# Patient Record
Sex: MALE | Race: WHITE | Employment: FULL TIME | URBAN - METROPOLITAN AREA
[De-identification: names, ages, dates, MRNs, and addresses within clinical notes are randomized per-mention and may not be internally consistent; named-entity substitution may affect disease eponyms.]

---

## 2021-02-16 ENCOUNTER — HOSPITAL ENCOUNTER (OUTPATIENT)
Age: 21
Discharge: HOME | End: 2021-02-18
Payer: COMMERCIAL

## 2021-02-16 ENCOUNTER — HOSPITAL ENCOUNTER (OUTPATIENT)
Dept: GENERAL RADIOLOGY | Age: 21
Discharge: HOME | End: 2021-02-18
Payer: COMMERCIAL

## 2021-02-16 DIAGNOSIS — M25.512 LEFT SHOULDER PAIN, UNSPECIFIED CHRONICITY: ICD-10-CM

## 2021-02-16 PROCEDURE — 73030 X-RAY EXAM OF SHOULDER: CPT

## 2021-04-12 ENCOUNTER — TELEPHONE (OUTPATIENT)
Dept: ADMINISTRATIVE | Age: 21
End: 2021-04-12

## 2021-04-12 NOTE — TELEPHONE ENCOUNTER
pt needs to reschedule would like to see if later today 4/12 if he can , pt got called out to work . .. contact pts mother Thalia Gross at [phone]

## 2021-04-21 ENCOUNTER — OFFICE VISIT (OUTPATIENT)
Dept: FAMILY MEDICINE CLINIC | Age: 21
End: 2021-04-21
Payer: COMMERCIAL

## 2021-04-21 VITALS
WEIGHT: 159 LBS | BODY MASS INDEX: 23.55 KG/M2 | HEART RATE: 66 BPM | HEIGHT: 69 IN | RESPIRATION RATE: 20 BRPM | TEMPERATURE: 97.9 F | SYSTOLIC BLOOD PRESSURE: 113 MMHG | DIASTOLIC BLOOD PRESSURE: 72 MMHG

## 2021-04-21 DIAGNOSIS — Z00.00 WELL ADULT EXAM: ICD-10-CM

## 2021-04-21 DIAGNOSIS — Z13.220 LIPID SCREENING: Primary | ICD-10-CM

## 2021-04-21 PROCEDURE — 99385 PREV VISIT NEW AGE 18-39: CPT | Performed by: FAMILY MEDICINE

## 2021-04-21 SDOH — ECONOMIC STABILITY: TRANSPORTATION INSECURITY
IN THE PAST 12 MONTHS, HAS THE LACK OF TRANSPORTATION KEPT YOU FROM MEDICAL APPOINTMENTS OR FROM GETTING MEDICATIONS?: NO

## 2021-04-21 SDOH — ECONOMIC STABILITY: INCOME INSECURITY: HOW HARD IS IT FOR YOU TO PAY FOR THE VERY BASICS LIKE FOOD, HOUSING, MEDICAL CARE, AND HEATING?: NOT HARD AT ALL

## 2021-04-21 ASSESSMENT — PATIENT HEALTH QUESTIONNAIRE - PHQ9
SUM OF ALL RESPONSES TO PHQ QUESTIONS 1-9: 0
1. LITTLE INTEREST OR PLEASURE IN DOING THINGS: 0
2. FEELING DOWN, DEPRESSED OR HOPELESS: 0
SUM OF ALL RESPONSES TO PHQ QUESTIONS 1-9: 0
SUM OF ALL RESPONSES TO PHQ QUESTIONS 1-9: 0

## 2021-04-21 ASSESSMENT — ENCOUNTER SYMPTOMS
PHOTOPHOBIA: 0
CONSTIPATION: 0
ABDOMINAL PAIN: 0
SINUS PAIN: 0
BACK PAIN: 0
DIARRHEA: 0
SHORTNESS OF BREATH: 0
EYE REDNESS: 0
RHINORRHEA: 0
COUGH: 0
EYE DISCHARGE: 0
SORE THROAT: 0

## 2021-04-21 NOTE — PROGRESS NOTES
2021    Carlie De Dios    Chief Complaint   Patient presents with   [facility]       HPI  History was obtained from patient. Ayesha Garza is a 23 y.o. male who presents today with the followin. Lipid screening    2. Well adult exam    Patient presents for establishing primary care. He states has been a couple years since he saw a primary care physician. Patient has no complaints today. Patient has a history of a humerus fracture on the left arm that he suffered 2 years ago while arm wrestling. This required open reduction internal fixation and he states he is pain-free at this time and has no deficits in his left arm. Patient does vape multiple times a day but he has never smoked cigarettes. He does not do any other drugs. Patient is on no medications. Patient works as a  for  [employer]   Constitutional: Negative for chills, fatigue and fever. HENT: Negative for congestion, mouth sores, postnasal drip, rhinorrhea, sinus pain and sore throat. Eyes: Negative for photophobia, discharge and redness. Respiratory: Negative for cough and shortness of breath. Cardiovascular: Negative for chest pain. Gastrointestinal: Negative for abdominal pain, constipation and diarrhea. Genitourinary: Negative for difficulty urinating, dysuria, hematuria and urgency. Musculoskeletal: Negative for arthralgias, back pain and myalgias. Neurological: Negative for headaches. Psychiatric/Behavioral: Negative for sleep disturbance. The patient is not nervous/anxious. PAST MEDICAL HISTORY  No past medical history on file. FAMILY HISTORY  No family history on file.     SOCIAL HISTORY  Social History     Socioeconomic History    Marital status: Single     Spouse name: None    Number of children: None    Years of education: None    Highest education level: None   Occupational History    None   Social Needs    Financial resource strain: Not hard at all    Food insecurity     Worry: Never true     Inability: Never true    Transportation needs     Medical: No     Non-medical: None   Tobacco Use    Smoking status: Never Smoker    Smokeless tobacco: Never Used   Substance and Sexual Activity    Alcohol use: None    Drug use: Yes     Types: Other-see comments     Comment: vape    Sexual activity: None   Lifestyle    Physical activity     Days per week: None     Minutes per session: None    Stress: None   Relationships    Social connections     Talks on phone: None     Gets together: None     Attends Christian service: None     Active member of club or organization: None     Attends meetings of clubs or organizations: None     Relationship status: None    Intimate partner violence     Fear of current or ex partner: None     Emotionally abused: None     Physically abused: None     Forced sexual activity: None   Other Topics Concern    None   Social History Narrative    None        SURGICAL HISTORY  No past surgical history on file. CURRENT MEDICATIONS  No current outpatient medications on file. No current facility-administered medications for this visit. ALLERGIES  No Known Allergies    PHYSICAL EXAM    /72   Pulse 66   Temp 97.9 °F (36.6 °C) (Temporal)   Resp 20   Ht 5' 9\" (1.753 m)   Wt 159 lb (72.1 kg)   BMI 23.48 kg/m²     Physical Exam  Vitals signs and nursing note reviewed. Constitutional:       Appearance: Normal appearance. He is normal weight. HENT:      Head: Normocephalic and atraumatic. Mouth/Throat:      Mouth: Mucous membranes are moist.      Pharynx: Oropharynx is clear. Eyes:      General: No scleral icterus. Conjunctiva/sclera: Conjunctivae normal.   Neck:      Musculoskeletal: Neck supple. Cardiovascular:      Rate and Rhythm: Normal rate and regular rhythm. Heart sounds: Normal heart sounds.    Pulmonary:      Effort: Pulmonary effort is normal.      Breath sounds: Normal breath sounds. Abdominal:      General: Bowel sounds are normal.      Palpations: Abdomen is soft. Neurological:      Mental Status: He is alert. Psychiatric:         Mood and Affect: Mood normal.         ASSESSMENT & Jordan Bokeelia was seen today for establish care. Diagnoses and all orders for this visit:    Lipid screening  -     Lipid, Fasting; Future  -     COMPREHENSIVE METABOLIC PANEL; Future    Well adult exam      Patient will be called with the results of his lab work when available. Return in about 1 year (around 4/21/2022). Electronically signed by Ruby Barrientos.  Aretha Fallon DO on 4/21/2021

## 2021-06-21 ENCOUNTER — OFFICE VISIT (OUTPATIENT)
Dept: FAMILY MEDICINE CLINIC | Age: 21
End: 2021-06-21
Payer: COMMERCIAL

## 2021-06-21 VITALS
TEMPERATURE: 99.9 F | RESPIRATION RATE: 20 BRPM | HEART RATE: 94 BPM | SYSTOLIC BLOOD PRESSURE: 116 MMHG | WEIGHT: 153.4 LBS | HEIGHT: 69 IN | DIASTOLIC BLOOD PRESSURE: 68 MMHG | OXYGEN SATURATION: 100 % | BODY MASS INDEX: 22.72 KG/M2

## 2021-06-21 DIAGNOSIS — B35.6 TINEA CRURIS: Primary | ICD-10-CM

## 2021-06-21 PROCEDURE — G8427 DOCREV CUR MEDS BY ELIG CLIN: HCPCS | Performed by: FAMILY MEDICINE

## 2021-06-21 PROCEDURE — G8420 CALC BMI NORM PARAMETERS: HCPCS | Performed by: FAMILY MEDICINE

## 2021-06-21 PROCEDURE — 1036F TOBACCO NON-USER: CPT | Performed by: FAMILY MEDICINE

## 2021-06-21 PROCEDURE — 99213 OFFICE O/P EST LOW 20 MIN: CPT | Performed by: FAMILY MEDICINE

## 2021-06-21 RX ORDER — CLOTRIMAZOLE 1 %
CREAM (GRAM) TOPICAL
Qty: 60 G | Refills: 1 | Status: SHIPPED | OUTPATIENT
Start: 2021-06-21 | End: 2021-06-28

## 2021-06-21 NOTE — PROGRESS NOTES
Minal Amato (:  2000) is a 23 y.o. male,Established patient, here for evaluation of the following chief complaint(s):  Pruritis (c/o itching in groin and redness )         ASSESSMENT/PLAN:  1. Tinea cruris      Return if symptoms worsen or fail to improve. Subjective   SUBJECTIVE/OBJECTIVE:  Patient presents with a red pruritic rash of the genital area that started last week. Patient has tried some over-the-counter antifungal but that was not until yesterday. He has not seen any improvement yet. Patient denies any dysuria or frequency      Review of Systems   Constitutional: Negative. Genitourinary:        Redness and pruritus of the genital area          Objective   Physical Exam  Vitals and nursing note reviewed. Constitutional:       Appearance: Normal appearance. Eyes:      General: No scleral icterus. Conjunctiva/sclera: Conjunctivae normal.   Genitourinary:     Comments: Patient has moderate erythema of the penis scrotum and surrounding skin. Appearance is consistent with tinea cruris  Neurological:      Mental Status: He is alert. Psychiatric:         Mood and Affect: Mood normal.                  An electronic signature was used to authenticate this note. --Sindy Kuo.  Magalie Lindquist

## 2021-06-23 ENCOUNTER — OFFICE VISIT (OUTPATIENT)
Dept: FAMILY MEDICINE CLINIC | Age: 21
End: 2021-06-23
Payer: COMMERCIAL

## 2021-06-23 VITALS
TEMPERATURE: 97.7 F | RESPIRATION RATE: 20 BRPM | HEIGHT: 69 IN | SYSTOLIC BLOOD PRESSURE: 126 MMHG | HEART RATE: 93 BPM | DIASTOLIC BLOOD PRESSURE: 66 MMHG | OXYGEN SATURATION: 99 % | BODY MASS INDEX: 22.66 KG/M2 | WEIGHT: 153 LBS

## 2021-06-23 DIAGNOSIS — B96.89 SKIN INFECTION, BACTERIAL: ICD-10-CM

## 2021-06-23 DIAGNOSIS — B35.6 TINEA CRURIS: ICD-10-CM

## 2021-06-23 DIAGNOSIS — B35.6 TINEA CRURIS: Primary | ICD-10-CM

## 2021-06-23 DIAGNOSIS — L08.9 SKIN INFECTION, BACTERIAL: ICD-10-CM

## 2021-06-23 PROCEDURE — 1036F TOBACCO NON-USER: CPT | Performed by: FAMILY MEDICINE

## 2021-06-23 PROCEDURE — G8427 DOCREV CUR MEDS BY ELIG CLIN: HCPCS | Performed by: FAMILY MEDICINE

## 2021-06-23 PROCEDURE — G8420 CALC BMI NORM PARAMETERS: HCPCS | Performed by: FAMILY MEDICINE

## 2021-06-23 PROCEDURE — 99213 OFFICE O/P EST LOW 20 MIN: CPT | Performed by: FAMILY MEDICINE

## 2021-06-23 RX ORDER — CLINDAMYCIN HYDROCHLORIDE 300 MG/1
300 CAPSULE ORAL 3 TIMES DAILY
Qty: 21 CAPSULE | Refills: 0 | Status: SHIPPED | OUTPATIENT
Start: 2021-06-23 | End: 2021-06-30

## 2021-06-23 NOTE — PROGRESS NOTES
Alessio Khan (:  2000) is a 23 y.o. male,Established patient, here for evaluation of the following chief complaint(s):  Pruritis (pt states that has gotten worst since monday )         ASSESSMENT/PLAN:  1. Tinea cruris  -     C.TRACHOMATIS [place]; Future  2. Skin infection, bacterial    Patient may have a secondary bacterial infection and is prescribed antibiotics for this. He is to continue using the clotrimazole until the rash has resolved. Return if symptoms worsen or fail to improve. Patient did request gonorrhea chlamydia test.  Patient will be called with those results. Subjective   SUBJECTIVE/OBJECTIVE:  Patient is here for follow-up of tinea curious. He was seen here 2 days ago and prescribed clotrimazole. He does not feel that it has gotten any better since that visit. Patient is sexually active. He has had no urethral discharge. He is concerned about the papules on today glans penis. He denies any pain but states that the area is pruritic. He has had no urinary frequency dysuria or urgency. Review of Systems   Constitutional: Negative. Genitourinary: Positive for genital sores. Negative for discharge, dysuria, frequency, hematuria, penile pain, scrotal swelling and urgency. Objective   Physical Exam  Vitals and nursing note reviewed. Genitourinary:     Comments: The appearance of patient's genital area does look improved from 2 days ago. There is much less erythema of the penis and scrotum. Patient does have papules on the glans penis and a few on the shaft of the penis that may represent secondary bacterial infection. He has no small papules or vesicles consistent with herpes. The area is not tender to palpation. No discharge is noted. An electronic signature was used to authenticate this note. --[facility] Hospital[facility]akeda BeardenC[facility].  Maximo Farrell

## 2021-06-26 ENCOUNTER — HOSPITAL ENCOUNTER (EMERGENCY)
Age: 21
Discharge: HOME | End: 2021-06-26
Attending: EMERGENCY MEDICINE
Payer: COMMERCIAL

## 2021-06-26 VITALS
RESPIRATION RATE: 16 BRPM | BODY MASS INDEX: 22.96 KG/M2 | TEMPERATURE: 98 F | WEIGHT: 155 LBS | OXYGEN SATURATION: 100 % | DIASTOLIC BLOOD PRESSURE: 60 MMHG | HEIGHT: 69 IN | SYSTOLIC BLOOD PRESSURE: 100 MMHG | HEART RATE: 68 BPM

## 2021-06-26 DIAGNOSIS — K12.1 MOUTH ULCERS: ICD-10-CM

## 2021-06-26 DIAGNOSIS — N50.89 ULCERS OF GENITAL ORGAN IN MALE: Primary | ICD-10-CM

## 2021-06-26 LAB
BACTERIA: NORMAL /HPF
BILIRUBIN URINE: NEGATIVE
BLOOD, URINE: NEGATIVE
CLARITY: CLEAR
COLOR: YELLOW
GLUCOSE URINE: NEGATIVE MG/DL
KETONES, URINE: NEGATIVE MG/DL
LEUKOCYTE ESTERASE, URINE: NEGATIVE
NITRITE, URINE: NEGATIVE
PH UA: 6 (ref 5–9)
PROTEIN UA: NEGATIVE MG/DL
RBC UA: NORMAL /HPF (ref 0–2)
SPECIFIC GRAVITY UA: 1.02 (ref 1–1.03)
UROBILINOGEN, URINE: 1 E.U./DL
WBC UA: NORMAL /HPF (ref 0–5)

## 2021-06-26 PROCEDURE — 96372 THER/PROPH/DIAG INJ SC/IM: CPT

## 2021-06-26 PROCEDURE — 87529 HSV DNA AMP PROBE: CPT

## 2021-06-26 PROCEDURE — 87491 CHLMYD TRACH DNA AMP PROBE: CPT

## 2021-06-26 PROCEDURE — 6360000002 HC RX W HCPCS: Performed by: EMERGENCY MEDICINE

## 2021-06-26 PROCEDURE — 99284 EMERGENCY DEPT VISIT MOD MDM: CPT

## 2021-06-26 PROCEDURE — 87591 N.GONORRHOEAE DNA AMP PROB: CPT

## 2021-06-26 PROCEDURE — 81001 URINALYSIS AUTO W/SCOPE: CPT

## 2021-06-26 RX ORDER — IBUPROFEN 800 MG/1
800 TABLET ORAL EVERY 8 HOURS PRN
Qty: 21 TABLET | Refills: 0 | Status: SHIPPED | OUTPATIENT
Start: 2021-06-26 | End: 2021-07-03

## 2021-06-26 RX ORDER — DIAPER,BRIEF,INFANT-TODD,DISP
EACH MISCELLANEOUS
Qty: 1 TUBE | Refills: 1 | Status: SHIPPED | OUTPATIENT
Start: 2021-06-26 | End: 2021-07-03

## 2021-06-26 RX ORDER — VALACYCLOVIR HYDROCHLORIDE 1 G/1
1000 TABLET, FILM COATED ORAL 2 TIMES DAILY
Qty: 20 TABLET | Refills: 0 | Status: SHIPPED | OUTPATIENT
Start: 2021-06-26 | End: 2021-06-26 | Stop reason: SDUPTHER

## 2021-06-26 RX ORDER — VALACYCLOVIR HYDROCHLORIDE 1 G/1
1000 TABLET, FILM COATED ORAL 2 TIMES DAILY
Qty: 20 TABLET | Refills: 0 | Status: SHIPPED | OUTPATIENT
Start: 2021-06-26 | End: 2021-07-06

## 2021-06-26 RX ORDER — KETOROLAC TROMETHAMINE 30 MG/ML
30 INJECTION, SOLUTION INTRAMUSCULAR; INTRAVENOUS ONCE
Status: COMPLETED | OUTPATIENT
Start: 2021-06-26 | End: 2021-06-26

## 2021-06-26 RX ORDER — DIAPER,BRIEF,INFANT-TODD,DISP
EACH MISCELLANEOUS
Qty: 1 TUBE | Refills: 1 | Status: SHIPPED | OUTPATIENT
Start: 2021-06-26 | End: 2021-06-26 | Stop reason: SDUPTHER

## 2021-06-26 RX ORDER — IBUPROFEN 800 MG/1
800 TABLET ORAL EVERY 8 HOURS PRN
Qty: 21 TABLET | Refills: 0 | Status: SHIPPED | OUTPATIENT
Start: 2021-06-26 | End: 2021-06-26 | Stop reason: SDUPTHER

## 2021-06-26 RX ADMIN — KETOROLAC TROMETHAMINE 30 MG: 30 INJECTION, SOLUTION INTRAMUSCULAR; INTRAVENOUS at 21:33

## 2021-06-26 ASSESSMENT — PAIN DESCRIPTION - PAIN TYPE: TYPE: ACUTE PAIN

## 2021-06-26 ASSESSMENT — PAIN SCALES - GENERAL
PAINLEVEL_OUTOF10: 8
PAINLEVEL_OUTOF10: 8

## 2021-06-27 NOTE — ED PROVIDER NOTES
Department of Emergency Medicine   ED  Provider Note  Admit Date/RoomTime: 6/26/2021  8:41 PM  ED Room: BRENDON/BRENDON          6/26/21  9:15 PM EDT    History of Present Illness:  Chief Complaint   Patient presents with    Penile Discharge     redness swelling, red dots    Oral Swelling        Sandra Tolliver is a 23 y.o. male presenting to the ED for penile lesions >1 week and now gum leasions. The complaint has been persistent, severe in severity, and worsened by nothing. Patient reports worsening rash to penis and scrotal region for the past week or so. He was seen by his family physician and started on clotrimazole for possible yeast infection and then the lesions got worse and he was added on clindamycin for possible secondary bacterial infection. He reports in starting the clindamycin a few days ago he is now having lesions to his gums, and his penile lesions are worse. He is sexually active. Review of Systems:   Pertinent positives and negatives are stated within HPI, all other systems reviewed and are negative. Review of Systems   Constitutional: Negative for chills and fever. HENT: Negative for ear pain, sinus pressure and sore throat. Eyes: Negative for pain, discharge and redness. Respiratory: Negative for cough, shortness of breath and wheezing. Cardiovascular: Negative for chest pain. Gastrointestinal: Negative for abdominal pain, diarrhea, nausea and vomiting. Genitourinary: Positive for penile pain. Negative for discharge, dysuria, frequency and testicular pain. Musculoskeletal: Negative for arthralgias and back pain. Skin: Negative for rash and wound. Neurological: Negative for weakness and headaches. Hematological: Negative for adenopathy.    All other systems reviewed and are negative.           --------------------------------------------- PAST HISTORY ---------------------------------------------  Past Medical History:  has no past medical history on appearing. Musculoskeletal:         General: No tenderness or deformity. Cervical back: Normal range of motion and neck supple. Skin:     General: Skin is warm and dry. Neurological:      Mental Status: He is alert and oriented to person, place, and time. Cranial Nerves: No cranial nerve deficit.       Coordination: Coordination normal.            -------------------------------------------------- RESULTS -------------------------------------------------    LABS: (Lab results interpreted by me)  Results for orders placed or performed during the hospital encounter of 06/26/21   C.trachomatis N.gonorrhoeae DNA, Urine    Specimen: Urine   Result Value Ref Range    Source Urine    Urinalysis with Microscopic   Result Value Ref Range    Color, UA Yellow Straw/Yellow    Clarity, UA Clear Clear    Glucose, Ur Negative Negative mg/dL    Bilirubin Urine Negative Negative    Ketones, Urine Negative Negative mg/dL    Specific Gravity, UA 1.025 1.005 - 1.030    Blood, Urine Negative Negative    pH, UA 6.0 5.0 - 9.0    Protein, UA Negative Negative mg/dL    Urobilinogen, Urine 1.0 <2.0 E.U./dL    Nitrite, Urine Negative Negative    Leukocyte Esterase, Urine Negative Negative    WBC, UA NONE 0 - 5 /HPF    RBC, UA NONE 0 - 2 /HPF    Bacteria, UA NONE SEEN None Seen /HPF   Herpes Simplex 1 & 2, Molecular   Result Value Ref Range    HSV 2 , PCR       Result: Herpes Simplex virus 2 DNA by PCR not detected  Normal Range: Not detected      Organism Herpes Simplex Virus 1 DNA DETECTED (A)     HSV 1, PCR Normal Range: Not detected        All radiology results have been personally reviewed by myself   RADIOLOGY:  Interpreted by Radiologist.  No orders to display           ------------------------------ ED COURSE/MEDICAL DECISION MAKING----------------------  Medications   ketorolac (TORADOL) injection 30 mg (30 mg Intramuscular Given 6/26/21 5403)       Re-evaluations & Consults:          Medical Decision Making:    Patient with severe penile and genital vesicles and ulcerations concerning for herpes simplex. Patient also now with gum ulcerations pain and swelling. Most likely herpes but with the new gingival lesions could be something more rare like Behcet's. Will start acyclovir as as he more likely option and given referrals for urology and dermatology. Herpes swab sent      This patient's ED course included: a personal history and physicial examination and re-evaluation prior to disposition    This patient has remained hemodynamically stable during their ED course. Critical Care Time:       Counseling: The emergency provider has spoken with the patient and discussed todays results, in addition to providing specific details for the plan of care and counseling regarding the diagnosis and prognosis. Questions are answered at this time and they are agreeable with the plan. Discharge Medication List as of 6/26/2021 10:23 PM           --------------------------------- IMPRESSION AND DISPOSITION ---------------------------------    IMPRESSION  1. Ulcers of genital organ in male    2. Mouth ulcers        DISPOSITION  Disposition: Discharge to home  Patient condition is stable      Dr. Alisha GRIFFIN, am the primary doctor of record. NOTE: This report was transcribed using voice recognition software.  Effort was made to ensure accuracy; however, inadvertent computerized transcription errors may be present         Alisha Givens DO  06/28/21 6592

## 2021-06-28 LAB
C. TRACHOMATIS DNA ,URINE: NEGATIVE
HSV 1 BY PCR: ABNORMAL
HSV 2 BY PCR: ABNORMAL
N. GONORRHOEAE DNA, URINE: NEGATIVE
ORGANISM: ABNORMAL
SOURCE: NORMAL

## 2021-06-28 ASSESSMENT — ENCOUNTER SYMPTOMS
SHORTNESS OF BREATH: 0
VOMITING: 0
EYE DISCHARGE: 0
WHEEZING: 0
EYE PAIN: 0
DIARRHEA: 0
EYE REDNESS: 0
NAUSEA: 0
ABDOMINAL PAIN: 0
BACK PAIN: 0
COUGH: 0
SORE THROAT: 0
SINUS PRESSURE: 0

## 2021-06-30 LAB
C. TRACHOMATIS DNA ,URINE: NEGATIVE
N. GONORRHOEAE DNA, URINE: NEGATIVE
SOURCE: NORMAL